# Patient Record
Sex: MALE | Race: WHITE | URBAN - METROPOLITAN AREA
[De-identification: names, ages, dates, MRNs, and addresses within clinical notes are randomized per-mention and may not be internally consistent; named-entity substitution may affect disease eponyms.]

---

## 2018-07-04 ENCOUNTER — HISTORICAL (OUTPATIENT)
Dept: ADMINISTRATIVE | Facility: HOSPITAL | Age: 23
End: 2018-07-04

## 2018-07-04 ENCOUNTER — HISTORICAL (OUTPATIENT)
Dept: URGENT CARE | Facility: CLINIC | Age: 23
End: 2018-07-04

## 2018-07-04 LAB
ABS NEUT (OLG): 3.14 X10(3)/MCL (ref 2.1–9.2)
ERYTHROCYTE [DISTWIDTH] IN BLOOD BY AUTOMATED COUNT: 11.9 % (ref 11.5–17)
HCT VFR BLD AUTO: 39 % (ref 42–52)
HGB BLD-MCNC: 13.3 GM/DL (ref 14–18)
INFLUENZA A ANTIGEN, POC: NEGATIVE
INFLUENZA B ANTIGEN, POC: NEGATIVE
LYMPHOCYTES NFR BLD MANUAL: 12 % (ref 13–40)
MCH RBC QN AUTO: 31 PG (ref 27–31)
MCHC RBC AUTO-ENTMCNC: 34.1 GM/DL (ref 33–36)
MCV RBC AUTO: 90.9 FL (ref 80–94)
MONOCYTES NFR BLD MANUAL: 11 % (ref 2–11)
NEUTROPHILS NFR BLD MANUAL: 76 % (ref 47–80)
PLATELET # BLD AUTO: 151 X10(3)/MCL (ref 130–400)
PLATELET # BLD EST: NORMAL 10*3/UL
PMV BLD AUTO: 11.2 FL (ref 7.4–10.4)
RAPID GROUP A STREP (OHS): POSITIVE
RBC # BLD AUTO: 4.29 X10(6)/MCL (ref 4.7–6.1)
WBC # SPEC AUTO: 4.5 X10(3)/MCL (ref 4.5–11.5)

## 2022-04-11 ENCOUNTER — HISTORICAL (OUTPATIENT)
Dept: ADMINISTRATIVE | Facility: HOSPITAL | Age: 27
End: 2022-04-11

## 2022-04-27 VITALS
SYSTOLIC BLOOD PRESSURE: 117 MMHG | DIASTOLIC BLOOD PRESSURE: 70 MMHG | OXYGEN SATURATION: 99 % | HEIGHT: 72 IN | WEIGHT: 151.25 LBS | BODY MASS INDEX: 20.49 KG/M2

## 2022-05-05 NOTE — HISTORICAL [facility] CERNER
This is a historical note converted from [employer]. Formatting and pictures may have been removed.  Please reference [employer] for original formatting and attached multimedia. Chief Complaint  bodyaches, chills, fever, chest hurts, bad headache, and swollen neck glands, and swollen right armpit gland x5 days  History of Present Illness  23-year-old male who denies chronic problems presents with?about 5 days of swollen?lymph node of the right axilla, small lymph nodes of the neck,?headache, chest congestion, body aches chills and fever. Temp today 39.2. No recent trauma. No known exposures.? Have not tried any medication.  Has not?traveled?out of the country.? Has not gotten scratched by a cat. No history of mono?known.  Review of Systems  Constitutional_negative for fever  ENMT_+rhinorrhea, + sinus pressure  Respiratory_+ mild cough  Gastrointestinal_negative for nausea or vomiting  Integumentary_negative for rash  Physical Exam  Vitals & Measurements  T:?39.2? ?C (Oral)? HR:?105(Peripheral)? RR:?18? BP:?117/70? SpO2:?99%?  HT:?182?cm? HT:?182?cm? WT:?68.6?kg? WT:?68.6?kg? BMI:?20.71?  General_alert,  Eye_normal conjunctiva  HENT_mucosa moist and pink, +erythema of nares with clear discharge, no PND, no sinus tenderness, few anterior cervical lymph nodes, but also multiple posterior cervical LN of the L?side that goes down to the clavicle,?tonsils erythematous and mildly enlarged without any discharge.? TM with light reflex present without any bulging or erythema.  Respiratory_positive?tightness throughout without focal wheezes?or rhonchi  Cardiovascular_regular rate and rhythm  Integumentary_warm pink and intact  Assessment/Plan  1.?Strep pharyngitis  ?Azithromycin as directed, otherwise ibuprofen and warm liquids, avoid food sharing, replace tooth brush. Will be non contagious after 3 doses.  Ordered:  azithromycin, = 1 packet(s), Oral, As Directed, as directed on package labeling, X 5 day(s), # 6 tab(s), 0 Refill(s),  Pharmacy: [facility]/pharmacy #[id number]  CBC w/ Auto Diff, Routine collect, 07/04/18 16:05:00 CDT, Blood, Order for future visit, Stop date 07/04/18 16:05:00 CDT, Lab Collect, No Charge, Strep pharyngitis  Cervical lymphadenopathy, 07/04/18 16:05:00 CDT  Andrey-Barr Virus (EBV) Acute Infection Abs Profile-[facility] [id number], Routine collect, 07/04/18 16:05:00 CDT, Blood, Order for future visit, Stop date 07/04/18 16:05:00 CDT, Lab Collect, No Charge, Strep pharyngitis  Cervical lymphadenopathy, 07/04/18 16:05:00 CDT  Mycoplasma Antibody IgM, Routine collect, 07/04/18 16:05:00 CDT, Blood, Order for future visit, Stop date 07/04/18 16:05:00 CDT, Lab Collect, No Charge, Strep pharyngitis  Cervical lymphadenopathy, 07/04/18 16:05:00 CDT  Office/Outpatient Visit [facility] 2 [place] [id number] [facility], Strep pharyngitis, [facility] 07/04/18 15:42:00 CDT  ?  2.?Cervical lymphadenopathy  ?Well further evaluate with blood check.?Will call with final result.  X-ray the chest done today, per my review and per radiology negative for any acute concerns.  Ordered:  azithromycin, = 1 packet(s), Oral, As Directed, as directed on package labeling, X 5 day(s), # 6 tab(s), 0 Refill(s), Pharmacy: [facility]/pharmacy #[id number]  CBC w/ Auto Diff, Routine collect, 07/04/18 16:05:00 CDT, Blood, Order for future visit, Stop date 07/04/18 16:05:00 CDT, Lab Collect, No Charge, Strep pharyngitis  Cervical lymphadenopathy, 07/04/18 16:05:00 CDT  Andrey-Barr Virus (EBV) Acute Infection Abs Profile-[facility] [id number], Routine collect, 07/04/18 16:05:00 CDT, Blood, Order for future visit, Stop date 07/04/18 16:05:00 CDT, Lab Collect, No Charge, Strep pharyngitis  Cervical lymphadenopathy, 07/04/18 16:05:00 CDT  Mycoplasma Antibody IgM, Routine collect, 07/04/18 16:05:00 CDT, Blood, Order for future visit, Stop date 07/04/18 16:05:00 CDT, Lab Collect, No Charge, Strep pharyngitis  Cervical lymphadenopathy, 07/04/18 16:05:00 CDT  ?  Fever, unspecified  ? Rapid flu swab negative  Rapid strep  swab?positive  ?   Problem List/Past Medical History  Ongoing  No chronic problems  Historical  No qualifying data  Procedure/Surgical History  Extraction of wisdom tooth.  Medications  No active medications  Allergies  No Known Allergies  No Known Medication Allergies  Social History  Alcohol  Never, 07/04/2018  Substance Abuse  Never, 07/04/2018  Tobacco  Never smoker Use:., 07/04/2018  Family History  Diabetes mellitus type 2: Grandfather, Grandfather and Grandmother.  Health Maintenance  Health Maintenance  ???Pending?(in the next year)  ??? ??Due?  ??? ? ? ?Alcohol Misuse Screening due??07/04/18??and every 1??year(s)  ??? ? ? ?Depression Screening due??07/04/18??and every 1??year(s)  ??? ? ? ?Tetanus Vaccine due??07/04/18??and every 10??year(s)  ??? ??Due In Future?  ??? ? ? ?Influenza Vaccine not due until??10/02/18??and every 1??year(s)  ???Satisfied?(in the past 1 year)  ??? ??Satisfied?  ??? ? ? ?Blood Pressure Screening on??07/04/18.??Satisfied by Carol Silva  ??? ? ? ?Body Mass Index Check on??07/04/18.??Satisfied by Carol Silva  ??? ? ? ?Obesity Screening on??07/04/18.??Satisfied by Carol Silva  ??? ? ? ?Tobacco Use Screening on??07/04/18.??Satisfied by Carol Silva  ?  ?